# Patient Record
Sex: MALE | URBAN - METROPOLITAN AREA
[De-identification: names, ages, dates, MRNs, and addresses within clinical notes are randomized per-mention and may not be internally consistent; named-entity substitution may affect disease eponyms.]

---

## 2023-11-27 ENCOUNTER — TELEPHONE (OUTPATIENT)
Dept: CARDIOLOGY | Facility: CLINIC | Age: 53
End: 2023-11-27
Payer: MEDICAID

## 2023-11-27 NOTE — TELEPHONE ENCOUNTER
Attempted without success x2 to contact patient to discuss appointment options. No voicemail available.     ----- Message from Manjinder Hebert MD sent at 11/27/2023  2:16 PM CST -----  Regarding: RE: Cardiology Referral-Dr. Hebert  Pt can see me first  ----- Message -----  From: An Gustafson LPN  Sent: 11/27/2023   1:18 PM CST  To: Manjinder Hebert MD  Subject: FW: Cardiology Referral-Dr. Hebert              Does this patient need to be scheduled with you or Dr. Estrada? Please advise.  ----- Message -----  From: Greg Cline  Sent: 11/27/2023  11:51 AM CST  To: [facility] Staff  Subject: Cardiology Referral-Dr. Hebert                  .Good morning,     Current patient is being referred to Dr. Hebert from Dr. Luzmaria Beckwith for Cardiology; Vascular insufficiency. I have scanned the referral and records in to media mgr. Please contact pt to schedule and let me know if I can help any further.     Thank you,    [facility]   Fax: [phone]